# Patient Record
Sex: MALE | Race: WHITE
[De-identification: names, ages, dates, MRNs, and addresses within clinical notes are randomized per-mention and may not be internally consistent; named-entity substitution may affect disease eponyms.]

---
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
## 2025-02-12 NOTE — PROGRESS NOTE - SURGERY
HAYLEE GILES 9/8/23 0747:


Subjective


Date Seen by a Provider:  Sep 8, 2023


Time Seen by a Provider:  07:42


Subjective/Events-last exam


Pt states pain is still at an 8-9/10 when the pain medications wear off every 3 

hours and then pain is 2/10 while on the pain medication. Pt is localized to LLQ

primarily with some in the epigastric area. Pt has not had a BM. Pt has remained

NPO. Pt is urinating normally. Pt was given 40mg Enoxaparin injections in the 

lower left and right abdomen on 9/5 and 9/6 but the injection was withheld last 

night due to upcoming surgery.


Review of Systems


General:  No Chills, No Fatigue; Appetite (hungry while NPO)


HEENT:  Head Aches (pt thinks due to caffeine withdrawal); No Visual Changes


Pulmonary:  No Dyspnea, No Cough


Cardiovascular:  No: Chest Pain, Palpitations


Gastrointestinal:  Abdominal Pain (LLQ and epigastric area), Other (painful 

hiccups / burps 1x per hour); No: Nausea, Vomiting


Genitourinary:  Frequency (says he has been urinating a lot - IV fluids); No 

Hematuria


Neurological:  No: Numbness, Confusion





Objective


Exam





Vital Signs








  Date Time  Temp Pulse Resp B/P (MAP) Pulse Ox O2 Delivery O2 Flow Rate FiO2


 


9/8/23 07:27 36.4 60 16 125/81 (96) 98 Room Air  


 


9/8/23 06:23 36.6       


 


9/8/23 04:59 36.6 80 18 150/89 (109) 97 Room Air  


 


9/8/23 00:34 36.6 87 18 142/78 (99) 98 Room Air  


 


9/7/23 21:20    157/88 (111)    


 


9/7/23 20:14     98 Room Air  


 


9/7/23 19:15 37.0 86 18 163/96 (118) 98 Room Air  


 


9/7/23 15:32 37.1 63 18 154/91 (112) 98 Room Air  


 


9/7/23 12:55  62      


 


9/7/23 11:16 36.6 61 14 134/81 (98) 98 Room Air  


 


9/7/23 09:00   71 138/85 (102)    


 


9/7/23 08:00      Room Air  


 


9/7/23 08:00 36.0 71 14 147/95 (112) 98 Room Air  














I & O 


 


 9/8/23





 06:59


 


Intake Total 2000 ml


 


Balance 2000 ml





Capillary Refill : Less Than 3 Seconds


General Appearance:  No Apparent Distress, Obese (mild)


HEENT:  No Scleral Icterus (L), No Scleral Icterus (R)


Neck:  Non Tender, Supple


Respiratory:  Lungs Clear ( to ascultation b/l), Normal Breath Sounds, No 

Accessory Muscle Use, No Respiratory Distress


Cardiovascular:  Regular Rate, Rhythm, No Murmur


Peripheral Pulses:  2+ Carotid (R), 2+ Carotid (L), 2+ Dorsalis Pedis (R), 2+ 

Left Dors-Pedis (L), 2+ Radial Pulses (R), 2+ Radial Pulses (L)


Gastrointestinal:  soft, no organomegaly, no pulsatile mass; No distended; 

tenderness (LLQ and epigastric area)


Neurologic/Psychiatric:  Alert, Oriented x3; No Depressed Affect; Other (anxiety

while in hospital)


Skin:  Normal Color, Warm/Dry, Other (when asked about any skin changes, pt 

states just small bruising at injection sites)





Results


Lab


Laboratory Tests


9/7/23 11:12: Glucometer 86


9/7/23 17:48: Glucometer 84


9/7/23 21:17: Glucometer 68L


9/8/23 00:40: Glucometer 77


9/8/23 05:44: 


White Blood Count 9.4, Red Blood Count 4.84, Hemoglobin 14.0, Hematocrit 42, 

Mean Corpuscular Volume 87, Mean Corpuscular Hemoglobin 29, Mean Corpuscular 

Hemoglobin Concent 33, Red Cell Distribution Width 13.0, Platelet Count 342, 

Mean Platelet Volume 9.8, Immature Granulocyte % (Auto) 0, Neutrophils (%) 

(Auto) 70, Lymphocytes (%) (Auto) 18, Monocytes (%) (Auto) 9, Eosinophils (%) 

(Auto) 3, Basophils (%) (Auto) 1, Neutrophils # (Auto) 6.5, Lymphocytes # (Auto)

1.7, Monocytes # (Auto) 0.8, Eosinophils # (Auto) 0.2, Basophils # (Auto) 0.1, 

Immature Granulocyte # (Auto) 0.0, Sodium Level 136, Potassium Level 3.9, 

Chloride Level 101, Carbon Dioxide Level 22, Anion Gap 13, Blood Urea Nitrogen 

7, Creatinine 0.81, Estimat Glomerular Filtration Rate 131, BUN/Creatinine Ratio

9, Glucose Level 84, Calcium Level 9.7, Corrected Calcium 9.5, Total Bilirubin 

1.4H, Aspartate Amino Transf (AST/SGOT) 16, Alanine Aminotransferase (ALT/SGPT) 

27, Alkaline Phosphatase 72, Total Protein 7.3, Albumin 4.3, Lipase 456H





Assessment/Plan


Biliary dyskinesia 9% EF - cholecystectomy


Pancreatitis - NPO, maintain IV fluids at 200ml/hour, pain management as needed,

cholecystectomy


Smoking -  on cessation





EUSEBIO LAU DO 9/8/23 0941:


Subjective


Time Seen by a Provider:  09:26


Subjective/Events-last exam


Pt seen and examined, no changes and still has some pain.


Review of Systems


General:  No Chills


HEENT:  Head Aches (pt thinks due to caffeine withdrawal); No Visual Changes


Pulmonary:  No Dyspnea, No Cough


Cardiovascular:  No: Chest Pain, Palpitations


Gastrointestinal:  Abdominal Pain (LLQ and epigastric area), Other (painful 

hiccups / burps 1x per hour); No: Nausea, Vomiting


Genitourinary:  Frequency (says he has been urinating a lot - IV fluids); No 

Hematuria





Objective


Exam


General Appearance:  No Apparent Distress, Obese (mild)


HEENT:  No Scleral Icterus (L), No Scleral Icterus (R)


Respiratory:  Lungs Clear ( to ascultation b/l), Normal Breath Sounds, No 

Accessory Muscle Use, No Respiratory Distress


Cardiovascular:  Regular Rate, Rhythm, No Murmur


Gastrointestinal:  soft, no organomegaly; No distended; tenderness (LLQ and 

epigastric area)


Neurologic/Psychiatric:  Alert, Oriented x3, Other (anxiety while in hospital)





Assessment/Plan


Biliary dyskinesia - 9% EF 


Pancreatitis - NPO, maintain IV fluids at 200ml/hour, pain management as needed,


Smoking -  on cessation





Plan is Lap margo today with possible cholangiogram, possible open.  Will get 

consent.  Discussed risks and complications; not limited to pain, bleeding, 

infection, scar, damage to bowel or bile duct and need for further procedure.  

Removing the 


gallbladder may not change his pain or help his pancreatitis.  All questions 

answered to his and his family's satisfaction.





Supervisory-Addendum Brief


Verification & Attestation


Participated in pt care:  history, MDM, physical


Personally performed:  exam, history, MDM, supervision of care


Care discussed with:  Medical Student


Procedures:  n/a


Verification and Attestation of Medical Student E/M Service





A medical student performed and documented this service. I then reviewed and 

verified all information documented by the medical student and made 

modifications to such information, when appropriate. I personally performed a 

physical exam, medical decision making and then discussed any differences 

between the notes and made revisions as necessary to create one note.





Eusebio Lau , 9/8/23 , 09:41











HAYLEE GILES                   Sep 8, 2023 07:47


EUSEBIO LAU DO                Sep 8, 2023 09:41 Patient alert and oriented. Patient kept clean and comfortable. Notified covering hospitalist of patient's family concerns. Patient's family would like to be notified of any changes and plan of care.     Problem: At Risk for Falls  Goal: Patient does not fall  Outcome: Monitoring/Evaluating progress  Goal: Patient takes action to control fall-related risks  Outcome: Monitoring/Evaluating progress     Problem: At Risk for Injury Due to Fall  Goal: Patient does not fall  Outcome: Monitoring/Evaluating progress  Goal: Takes action to control condition specific risks  Outcome: Monitoring/Evaluating progress  Goal: Verbalizes understanding of fall-related injury personal risks  Description: Document education using the patient education activity  Outcome: Monitoring/Evaluating progress     Problem: Skin Integrity Alteration  Goal: Skin remains intact with no new/deterioration of wound or pressure injury  Outcome: Monitoring/Evaluating progress  Goal: Participates in wound care activities  Outcome: Monitoring/Evaluating progress  Goal: Comfort optimized with pressure injury prevention strategies guided by patient/family preference. (Hospice)  Outcome: Monitoring/Evaluating progress  Goal: Wound care provided to promote comfort needs (Hospice)  Outcome: Monitoring/Evaluating progress